# Patient Record
Sex: MALE | URBAN - METROPOLITAN AREA
[De-identification: names, ages, dates, MRNs, and addresses within clinical notes are randomized per-mention and may not be internally consistent; named-entity substitution may affect disease eponyms.]

---

## 2024-11-10 ENCOUNTER — EMERGENCY (EMERGENCY)
Facility: HOSPITAL | Age: 39
LOS: 1 days | Discharge: HOME | End: 2024-11-10
Attending: EMERGENCY MEDICINE
Payer: COMMERCIAL

## 2024-11-10 VITALS
DIASTOLIC BLOOD PRESSURE: 76 MMHG | SYSTOLIC BLOOD PRESSURE: 128 MMHG | WEIGHT: 163.14 LBS | HEART RATE: 69 BPM | TEMPERATURE: 98 F | HEIGHT: 70 IN | OXYGEN SATURATION: 97 % | RESPIRATION RATE: 19 BRPM

## 2024-11-10 PROCEDURE — 99283 EMERGENCY DEPT VISIT LOW MDM: CPT

## 2024-11-10 RX ORDER — PSEUDOEPHEDRINE HCL 30 MG
1 TABLET ORAL
Qty: 12 | Refills: 0
Start: 2024-11-10 | End: 2024-11-12

## 2024-11-10 RX ORDER — PSEUDOEPHEDRINE HCL 30 MG
30 TABLET ORAL ONCE
Refills: 0 | Status: COMPLETED | OUTPATIENT
Start: 2024-11-10 | End: 2024-11-10

## 2024-11-10 RX ADMIN — Medication 30 MILLIGRAM(S): at 22:38

## 2024-11-10 NOTE — ED PROVIDER NOTE - OBJECTIVE STATEMENT
Patient is a 39-year-old gentleman with a past medical history of nasal congestion who presents to the ED because of nasal congestion and anxiety.  Patient has been taking Afrin for many years, but has been dealing with rebound congestion.  Recently went to ENT and was stopped off of Afrin, placed on steroids.  Patient is feeling anxious when he is sleeping that he will not be able to breathe.  No history of anxiety before in the past.  No fever, no discharge.

## 2024-11-10 NOTE — ED ADULT NURSE NOTE - OBJECTIVE STATEMENT
Patient arrived to ER wife at bedside c/o have a problem with my turbinates enlarged ,seen by ent monday with prescriptions flonase and steroid  ,sometimes its hard to breathe  and gives me anxiety at night

## 2024-11-10 NOTE — ED PROVIDER NOTE - PATIENT PORTAL LINK FT
You can access the FollowMyHealth Patient Portal offered by [facility] by registering at the following website: [email or web address]. By joining [employer]’s FollowMyHealth portal, you will also be able to view your health information using other applications (apps) compatible with our system.

## 2024-11-10 NOTE — ED ADULT TRIAGE NOTE - CHIEF COMPLAINT QUOTE
as per pt I have a problem with my turbinates enlarged ,seen by ent monday with prescriptions flonase and steroid  ,sometimes its hard to breathe  and gives me anxiety at night

## 2024-11-13 ENCOUNTER — EMERGENCY (EMERGENCY)
Facility: HOSPITAL | Age: 39
LOS: 1 days | Discharge: HOME | End: 2024-11-13
Attending: EMERGENCY MEDICINE
Payer: COMMERCIAL

## 2024-11-13 VITALS
WEIGHT: 158.95 LBS | RESPIRATION RATE: 17 BRPM | TEMPERATURE: 98 F | HEART RATE: 69 BPM | SYSTOLIC BLOOD PRESSURE: 143 MMHG | HEIGHT: 70 IN | DIASTOLIC BLOOD PRESSURE: 81 MMHG | OXYGEN SATURATION: 100 %

## 2024-11-13 VITALS
TEMPERATURE: 98 F | HEART RATE: 75 BPM | DIASTOLIC BLOOD PRESSURE: 67 MMHG | SYSTOLIC BLOOD PRESSURE: 105 MMHG | RESPIRATION RATE: 18 BRPM | OXYGEN SATURATION: 99 %

## 2024-11-13 DIAGNOSIS — F41.9 ANXIETY DISORDER, UNSPECIFIED: ICD-10-CM

## 2024-11-13 LAB
ALBUMIN SERPL ELPH-MCNC: 4.5 G/DL — SIGNIFICANT CHANGE UP (ref 3.5–5)
ALP SERPL-CCNC: 48 U/L — SIGNIFICANT CHANGE UP (ref 40–120)
ALT FLD-CCNC: 37 U/L DA — SIGNIFICANT CHANGE UP (ref 10–60)
ANION GAP SERPL CALC-SCNC: 8 MMOL/L — SIGNIFICANT CHANGE UP (ref 5–17)
APAP SERPL-MCNC: <2 UG/ML — LOW (ref 10–30)
AST SERPL-CCNC: 40 U/L — SIGNIFICANT CHANGE UP (ref 10–40)
BASOPHILS # BLD AUTO: 0.05 K/UL — SIGNIFICANT CHANGE UP (ref 0–0.2)
BASOPHILS NFR BLD AUTO: 0.5 % — SIGNIFICANT CHANGE UP (ref 0–2)
BILIRUB SERPL-MCNC: 1.1 MG/DL — SIGNIFICANT CHANGE UP (ref 0.2–1.2)
BUN SERPL-MCNC: 14 MG/DL — SIGNIFICANT CHANGE UP (ref 7–18)
CALCIUM SERPL-MCNC: 9.5 MG/DL — SIGNIFICANT CHANGE UP (ref 8.4–10.5)
CHLORIDE SERPL-SCNC: 105 MMOL/L — SIGNIFICANT CHANGE UP (ref 96–108)
CO2 SERPL-SCNC: 24 MMOL/L — SIGNIFICANT CHANGE UP (ref 22–31)
CREAT SERPL-MCNC: 1.09 MG/DL — SIGNIFICANT CHANGE UP (ref 0.5–1.3)
EGFR: 88 ML/MIN/1.73M2 — SIGNIFICANT CHANGE UP
EOSINOPHIL # BLD AUTO: 0.04 K/UL — SIGNIFICANT CHANGE UP (ref 0–0.5)
EOSINOPHIL NFR BLD AUTO: 0.4 % — SIGNIFICANT CHANGE UP (ref 0–6)
ETHANOL SERPL-MCNC: 7 MG/DL — SIGNIFICANT CHANGE UP (ref 0–10)
GLUCOSE SERPL-MCNC: 127 MG/DL — HIGH (ref 70–99)
HCT VFR BLD CALC: 45.5 % — SIGNIFICANT CHANGE UP (ref 39–50)
HGB BLD-MCNC: 16.7 G/DL — SIGNIFICANT CHANGE UP (ref 13–17)
IMM GRANULOCYTES NFR BLD AUTO: 0.4 % — SIGNIFICANT CHANGE UP (ref 0–0.9)
LYMPHOCYTES # BLD AUTO: 1.56 K/UL — SIGNIFICANT CHANGE UP (ref 1–3.3)
LYMPHOCYTES # BLD AUTO: 16.5 % — SIGNIFICANT CHANGE UP (ref 13–44)
MCHC RBC-ENTMCNC: 30.9 PG — SIGNIFICANT CHANGE UP (ref 27–34)
MCHC RBC-ENTMCNC: 36.7 G/DL — HIGH (ref 32–36)
MCV RBC AUTO: 84.3 FL — SIGNIFICANT CHANGE UP (ref 80–100)
MONOCYTES # BLD AUTO: 0.62 K/UL — SIGNIFICANT CHANGE UP (ref 0–0.9)
MONOCYTES NFR BLD AUTO: 6.6 % — SIGNIFICANT CHANGE UP (ref 2–14)
NEUTROPHILS # BLD AUTO: 7.14 K/UL — SIGNIFICANT CHANGE UP (ref 1.8–7.4)
NEUTROPHILS NFR BLD AUTO: 75.6 % — SIGNIFICANT CHANGE UP (ref 43–77)
NRBC # BLD: 0 /100 WBCS — SIGNIFICANT CHANGE UP (ref 0–0)
PLATELET # BLD AUTO: 312 K/UL — SIGNIFICANT CHANGE UP (ref 150–400)
POTASSIUM SERPL-MCNC: 3.7 MMOL/L — SIGNIFICANT CHANGE UP (ref 3.5–5.3)
POTASSIUM SERPL-SCNC: 3.7 MMOL/L — SIGNIFICANT CHANGE UP (ref 3.5–5.3)
PROT SERPL-MCNC: 8.1 G/DL — SIGNIFICANT CHANGE UP (ref 6–8.3)
RBC # BLD: 5.4 M/UL — SIGNIFICANT CHANGE UP (ref 4.2–5.8)
RBC # FLD: 11.9 % — SIGNIFICANT CHANGE UP (ref 10.3–14.5)
SALICYLATES SERPL-MCNC: <1.7 MG/DL — LOW (ref 2.8–20)
SODIUM SERPL-SCNC: 137 MMOL/L — SIGNIFICANT CHANGE UP (ref 135–145)
TROPONIN I, HIGH SENSITIVITY RESULT: 3.6 NG/L — SIGNIFICANT CHANGE UP
WBC # BLD: 9.45 K/UL — SIGNIFICANT CHANGE UP (ref 3.8–10.5)
WBC # FLD AUTO: 9.45 K/UL — SIGNIFICANT CHANGE UP (ref 3.8–10.5)

## 2024-11-13 PROCEDURE — 85025 COMPLETE CBC W/AUTO DIFF WBC: CPT

## 2024-11-13 PROCEDURE — 93005 ELECTROCARDIOGRAM TRACING: CPT

## 2024-11-13 PROCEDURE — 80053 COMPREHEN METABOLIC PANEL: CPT

## 2024-11-13 PROCEDURE — 99284 EMERGENCY DEPT VISIT MOD MDM: CPT | Mod: 25

## 2024-11-13 PROCEDURE — 84484 ASSAY OF TROPONIN QUANT: CPT

## 2024-11-13 PROCEDURE — 90792 PSYCH DIAG EVAL W/MED SRVCS: CPT | Mod: 95

## 2024-11-13 PROCEDURE — 99285 EMERGENCY DEPT VISIT HI MDM: CPT

## 2024-11-13 PROCEDURE — 80307 DRUG TEST PRSMV CHEM ANLYZR: CPT

## 2024-11-13 PROCEDURE — 36415 COLL VENOUS BLD VENIPUNCTURE: CPT

## 2024-11-13 RX ORDER — QUETIAPINE FUMARATE 200 MG/1
1 TABLET ORAL
Qty: 5 | Refills: 0
Start: 2024-11-13 | End: 2024-11-17

## 2024-11-13 RX ORDER — LORAZEPAM 2 MG
0.5 TABLET ORAL ONCE
Refills: 0 | Status: DISCONTINUED | OUTPATIENT
Start: 2024-11-13 | End: 2024-11-13

## 2024-11-13 RX ADMIN — Medication 0.5 MILLIGRAM(S): at 11:45

## 2024-11-13 NOTE — ED PROVIDER NOTE - NSFOLLOWUPCLINICS_GEN_ALL_ED_FT
[facility]  Psychiatry  [address] [place] [address]  Phone: [phone]  Fax:

## 2024-11-13 NOTE — ED PROVIDER NOTE - IV ALTEPLASE EXCL REL HIDDEN
[facility] Call Center    Phone Message    Name of Caller: RAMIRO ARRIETA    Phone Number: Cell number on file:    Telephone Information:   Mobile [phone]       Best time to return call: NOW. Pt at [id number] - [place], [place] - [address]      May a detailed message be left on voicemail: no    Relation to patient: Self    Reason for Call: Other: Please call.  Pt is at pharmacy waiting. Wants something for pain today.      Action Taken: Message routed to:  Adult Clinics: Sports Medicine p [id number]   show

## 2024-11-13 NOTE — ED BEHAVIORAL HEALTH NOTE - BEHAVIORAL HEALTH NOTE
========================  FOR EACH COLLATERAL  ========================  Collateral below has requested that the information provided remain confidential: Yes [  ] No [x  ]  Collateral below has provided information that patient is/may be unaware of: Yes [  ] No [ x ]  Patient gives permission to obtain collateral from _____:  (  ) Yes  ( x )  No  Rationale for overriding objection            (  ) Lack of capacity. Details: ________            ( x ) Assessing risk of danger to self/others. Details: ________  Rationale for selecting specific collateral source            (  ) Potential to impact risk of danger to self/others and no alternative equivalent. Details: _____  NAME: Lauren   NUMBER:  [phone]  RELATIONSHIP: Spouse  RELIABILITY: Reliable   COMMENTS: Collateral did not report any acute ongoing safety concerns but reports that pt feels safer in the hospital due to his fear of not being able to breathe.   ========================  PATIENT DEMOGRAPHICS:  ========================  HPI  BASELINE FUNCTIONING: Patient is a 39y old male, domiciled w/ wife, employed as a teacher, no formal pphx, pmhx chronic sensation of difficulty breathing through his nose., allergic to  Ceclor, no hx of SI.SIB.SA.   DATE HPI STARTED: Approx 1 week ago  DECOMPENSATION: Collateral reported that approx 1 week ago pt started having trouble breathing which led to him feel anxious and having panic attacks. Pt has not been able to sleep; feels that he is going to stop breathing in his sleep. Pt has not been able to concentrate on work or on the shows that he watches. Pt has not had energy to go to work or the gym; pt has been off his baseline. Collateral reported that this has been going on for 1 week but today pt experienced chest pain which triggered him to come in to the ED today versus yesterday or any other day. Denied PI.AVH.   SUICIDALITY: No recent SI.SIB.SA.   VIOLENCE: Declined.   SUBSTANCE: Pt will occasionally have a beer, no MJ or any other substances.   ========================  PAST PSYCHIATRIC HISTORY  ========================  DATE PAST PSYCHIATRIC HISTORY STARTED: N/A   MAIN PSYCHIATRIC DIAGNOSIS: No pphx.   PSYCHIATRIC HOSPITALIZATIONS: No hx of IPP   PRIOR ILLNESS: Declined.   SUICIDALITY: No hx of SI.SIB.SA.  VIOLENCE: Declined.   SUBSTANCE USE: Pt will occasionally have a beer, no MJ or any other substances.   ==============  OTHER HISTORY  ==============  CURRENT MEDICATION: None.   MEDICAL HISTORY: Pmhx chronic sensation of difficulty breathing through his nose.  ALLERGIES: Ceclor  LEGAL ISSUES: Declined.   FIREARM ACCESS: No access to weapons, guns or firearms.   SOCIAL HISTORY: Declined.   FAMILY HISTORY: Collateral reported that pt's family has hx of Anxiety.   DEVELOPMENTAL HISTORY:  Declined.           ==============  COVID Exposure Screen- collateral (i.e. third-party, chart review, belongings, etc; include EMS and ED staff  ==============  Has the patient been tested for COVID-19 in the last 90 days?  (  ) Yes   (  ) No   ( x ) Unknown- Reason: _____  IF YES: Date of test(s), type of test(s), result(s) for ALL tests in last 90 days: ________  In the past 10 days, has the patient been around anyone with a positive COVID-19 test? (  ) Yes   (  ) No   (x  ) Unknown- Reason: ____  IF YES: Was the patient closer than 6 feet of them for a total of 15 minutes or more in a 24 hour period? (  ) Yes   (  ) No   (  ) Unknown- Reason: _____

## 2024-11-13 NOTE — ED PROVIDER NOTE - NSFOLLOWUPINSTRUCTIONS_ED_ALL_ED_FT
Thank you for choosing [facility] for your healthcare.    You were seen in the Emergency Department for anxiety and difficulty sleeping.  In the emergency department you had screening blood work and an EKG which showed no signs of a heart attack or life-threatening cause of your symptoms.  You were evaluated by the psychiatrist and they suspect your recent steroid course is what is causing your current symptoms.  We are prescribing you a medication that is an antipsychotic to help you sleep at night.  Please take it as prescribed.  Because we are prescribing you this does not mean that you are psychotic or that you have any underlying psychiatric disease, we are using it as a sleep aid to help you reset.  We have given you the information for the walk-in psychiatric crisis center at [facility] where you can present for any concerning or worsening psychiatric symptoms.  You can also return to the emergency department for any worsening or concerning symptoms.

## 2024-11-13 NOTE — ED PROVIDER NOTE - PATIENT PORTAL LINK FT
You can access the FollowMyHealth Patient Portal offered by [facility] by registering at the following website: [email or web address]. By joining [employer]’s FollowMyHealth portal, you will also be able to view your health information using other applications (apps) compatible with our system.

## 2024-11-13 NOTE — ED PROVIDER NOTE - OBJECTIVE STATEMENT
39-year-old man presenting reporting chest discomfort, difficulty sleeping, chronic sensation of difficulty breathing through his nose.  He reports he has been having issues with breathing through his nose for years and he is using a large amount of Afrin.  He has stopped this over the past few months with the help of an ENT but feels like he is having trouble breathing at night and over the past week reports that he has not been getting much of any sleep.  He has been having difficulty going to work or functioning and this morning woke in the middle of the night confused/not making sense that he was again unable to work.  Denies any suicidality, homicidality, auditory or visual hallucinations but does endorse significant anxiety and stress.  History is somewhat tangential and he continually returns back to his chronic nasal issues but he is redirectable.  He does not take anything for anxiety at this time.  He has tried reaching out to a therapist but the first appointment is months away.  Endorses this morning he had an episode of chest tightness that radiated to his arms and back, lasted briefly, was not exertional is now gone away.  Reports that he is usually very active and does not get chest pains with exertion.

## 2024-11-13 NOTE — ED PROVIDER NOTE - CLINICAL SUMMARY MEDICAL DECISION MAKING FREE TEXT BOX
Patient presenting with insomnia and nonspecific somatic symptoms likely in setting of severe anxiety and lack of sleep.  Was endorsing chest pain this morning so will obtain labs troponin and EKG to rule out ACS however I have a much higher suspicion for an acute psychiatric etiology of his symptoms.  Will also obtain aspirin salicylate and alcohol levels, treat with Ativan and request telepsychiatry evaluation for eligibility for possible voluntary hospitalization and recommendations for further treatment.

## 2024-11-13 NOTE — ED ADULT NURSE NOTE - NSFALLUNIVINTERV_ED_ALL_ED
Bed/Stretcher in lowest position, wheels locked, appropriate side rails in place/Call bell, personal items and telephone in reach/Instruct patient to call for assistance before getting out of bed/chair/stretcher/Non-slip footwear applied when patient is off stretcher/[place] to call system/Physically safe environment - no spills, clutter or unnecessary equipment/Purposeful proactive rounding/Room/bathroom lighting operational, light cord in reach

## 2024-11-13 NOTE — ED BEHAVIORAL HEALTH ASSESSMENT NOTE - REFERRAL / APPOINTMENT DETAILS
Called patient no answer left voice message Provided with information to [facility] crisis center

## 2024-11-13 NOTE — ED BEHAVIORAL HEALTH ASSESSMENT NOTE - SUMMARY
39M, living w wife, employed, PMH of nasal congestion from enlarge turbinates, no formal psych hx, no suicide attempts or hospitalizations, never in psychiatric care though hx of mild anxiety when wearing masks during covid, no substance abuse, now BIBS accompanied by wife out of concern for insomnia, anxiety around difficulty breathing in setting of recent steroid use 39M, living w wife, employed, PMH of nasal congestion from enlarge turbinates, no formal psych hx, no suicide attempts or hospitalizations, never in psychiatric care though hx of mild anxiety when wearing masks during covid, no substance abuse, now BIBS accompanied by wife out of concern for insomnia, anxiety around difficulty breathing in setting of recent steroid use    Given that patient has no significant psychiatric history prior to starting steroids, and since then hasn't slept, and that he has no family history of significant psychiatric illness, 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 Per ED attending patient's initial preoccupation with nasal blockage may have bordered on psychotic, but responded well to ativan. He has not been functioning but has consistently denied any suicide ideation or HI, denies any auditory, visual, or tactile hallucinations, and lives with supportive wife, it makes sense to attempt outpatient treatment first as it does not appear that he is an imminent danger to himself or anyone else (see risk assessment below). He would likely benefit from short course of seroquel at night, as well as connection to outpatient care.

## 2024-11-13 NOTE — ED PROVIDER NOTE - PROGRESS NOTE DETAILS
Patient evaluated and cleared by telepsychiatry.  I discussed the case with telepsychiatry attending Dr Dobson.  He is suspecting that symptoms are induced by recent outpatient oral steroid course.  Outpatient information for the crisis center provided.  He is recommending Seroquel 50 nightly which I will prescribe.  Patient is comfortable with going home.

## 2024-11-13 NOTE — ED BEHAVIORAL HEALTH ASSESSMENT NOTE - DETAILS
Would not impact clinical decision making at this time Denies Return to the ED should you develop thoughts of hurting or killing yourself or anyone else Patient is referent

## 2024-11-13 NOTE — ED BEHAVIORAL HEALTH ASSESSMENT NOTE - RISK ASSESSMENT
Risk factors include anxiety, insomnia, lack of outpatient care. protective factors include lack of prior attempts, lack of suicide ideation, stable housing, employment, supportive wife, sobriety, lack of access to firearms.

## 2024-11-13 NOTE — ED BEHAVIORAL HEALTH ASSESSMENT NOTE - HPI (INCLUDE ILLNESS QUALITY, SEVERITY, DURATION, TIMING, CONTEXT, MODIFYING FACTORS, ASSOCIATED SIGNS AND SYMPTOMS)
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 He has enlarged nasal turbinates and has been taking affrin at night for years to help with sleep due to this. Last Monday he saw a ENT who had him stop affrin, and on Wednesday he saw a second ENT who started him on a course of oral steroids. SInce that time he states he has been unable to sleep, 0-3 hours per night, with decreased need for sleep during the day, increased anxiety, pacing at night. He denies any suicide ideation or HI, denies auditory, visual, or tactile hallucinations, denies paranoia, denies family history of psychiatric symptoms other than mild anxiety. Denies access to firearms. He took his last dose of steroid two nights ago and last night took melatonin, woke up somewhat disoriented but now feels normal. He was given ativan in the ED which he states helped.    See [facility] note for collateral from wife. This writer also spoke to wife who corroborated the above.     Discussed likelihood that steroids and insomnia were the cause of his symptoms and that he would likely benefit from short course of seroquel and initiation of outpatient care. Option of inpatient was discussed but patient and wife felt that patient would best be served going home and following up at crisis center.

## 2024-11-13 NOTE — ED BEHAVIORAL HEALTH ASSESSMENT NOTE - DIFFERENTIAL
steroid induced anxiety disorder, r/o steroid induced psychosis, mdd, RANDY, schizophrenia, schizoaffective, bipolar, somatic symptom disorder

## 2024-11-13 NOTE — ED BEHAVIORAL HEALTH ASSESSMENT NOTE - MEDICATIONS (PRESCRIPTIONS, DIRECTIONS)
Please prescribe Seroquel 50mg PO qHS x 5 nights. Discussed risks and benefits of this with patient.

## 2024-11-13 NOTE — ED PROVIDER NOTE - PHYSICAL EXAMINATION
Exam:  General: Patient well appearing, vital signs within normal limits  HEENT: airway patent with moist mucous membranes  Cardiac: RRR S1/S2 with strong peripheral pulses  Respiratory: lungs clear without respiratory distress  GI: abdomen soft, non tender, non distended  Neuro: no gross neurologic deficits  Skin: warm, well perfused  Psych: extrememly anxious appearing

## 2024-11-14 ENCOUNTER — OUTPATIENT (OUTPATIENT)
Dept: OUTPATIENT SERVICES | Facility: HOSPITAL | Age: 39
LOS: 1 days | Discharge: STILL ADMITTED OR ADMINISTRATIVE | End: 2024-11-14
Payer: COMMERCIAL

## 2024-11-22 DIAGNOSIS — F41.9 ANXIETY DISORDER, UNSPECIFIED: ICD-10-CM
